# Patient Record
Sex: FEMALE | Race: BLACK OR AFRICAN AMERICAN | NOT HISPANIC OR LATINO | Employment: FULL TIME | URBAN - METROPOLITAN AREA
[De-identification: names, ages, dates, MRNs, and addresses within clinical notes are randomized per-mention and may not be internally consistent; named-entity substitution may affect disease eponyms.]

---

## 2017-01-10 ENCOUNTER — OFFICE VISIT (OUTPATIENT)
Dept: INTERNAL MEDICINE | Facility: CLINIC | Age: 57
End: 2017-01-10
Payer: COMMERCIAL

## 2017-01-10 ENCOUNTER — TELEPHONE (OUTPATIENT)
Dept: INTERNAL MEDICINE | Facility: CLINIC | Age: 57
End: 2017-01-10

## 2017-01-10 VITALS
BODY MASS INDEX: 46.31 KG/M2 | TEMPERATURE: 98 F | HEIGHT: 60 IN | SYSTOLIC BLOOD PRESSURE: 161 MMHG | DIASTOLIC BLOOD PRESSURE: 77 MMHG | RESPIRATION RATE: 16 BRPM | OXYGEN SATURATION: 97 % | HEART RATE: 90 BPM | WEIGHT: 235.88 LBS

## 2017-01-10 DIAGNOSIS — M79.604 LEG PAIN, BILATERAL: Primary | ICD-10-CM

## 2017-01-10 DIAGNOSIS — I15.2 HYPERTENSION ASSOCIATED WITH DIABETES: ICD-10-CM

## 2017-01-10 DIAGNOSIS — E11.59 HYPERTENSION ASSOCIATED WITH DIABETES: ICD-10-CM

## 2017-01-10 DIAGNOSIS — E11.8 TYPE 2 DIABETES MELLITUS WITH COMPLICATION, WITH LONG-TERM CURRENT USE OF INSULIN: ICD-10-CM

## 2017-01-10 DIAGNOSIS — Z79.4 TYPE 2 DIABETES MELLITUS WITH COMPLICATION, WITH LONG-TERM CURRENT USE OF INSULIN: ICD-10-CM

## 2017-01-10 DIAGNOSIS — M79.605 LEG PAIN, BILATERAL: Primary | ICD-10-CM

## 2017-01-10 PROCEDURE — 4010F ACE/ARB THERAPY RXD/TAKEN: CPT | Mod: S$GLB,,, | Performed by: NURSE PRACTITIONER

## 2017-01-10 PROCEDURE — 2022F DILAT RTA XM EVC RTNOPTHY: CPT | Mod: S$GLB,,, | Performed by: NURSE PRACTITIONER

## 2017-01-10 PROCEDURE — 1159F MED LIST DOCD IN RCRD: CPT | Mod: S$GLB,,, | Performed by: NURSE PRACTITIONER

## 2017-01-10 PROCEDURE — 3077F SYST BP >= 140 MM HG: CPT | Mod: S$GLB,,, | Performed by: NURSE PRACTITIONER

## 2017-01-10 PROCEDURE — 3078F DIAST BP <80 MM HG: CPT | Mod: S$GLB,,, | Performed by: NURSE PRACTITIONER

## 2017-01-10 PROCEDURE — 99213 OFFICE O/P EST LOW 20 MIN: CPT | Mod: S$GLB,,, | Performed by: NURSE PRACTITIONER

## 2017-01-10 PROCEDURE — 99999 PR PBB SHADOW E&M-EST. PATIENT-LVL III: CPT | Mod: PBBFAC,,, | Performed by: NURSE PRACTITIONER

## 2017-01-10 PROCEDURE — 3046F HEMOGLOBIN A1C LEVEL >9.0%: CPT | Mod: S$GLB,,, | Performed by: NURSE PRACTITIONER

## 2017-01-10 RX ORDER — DICLOFENAC SODIUM 30 MG/G
GEL TOPICAL
Qty: 100 G | Refills: 2 | Status: SHIPPED | OUTPATIENT
Start: 2017-01-10

## 2017-01-10 NOTE — MR AVS SNAPSHOT
[facility]  [address] [address] [address]  Phone: [phone]                  Treasure Wise   1/10/2017 3:40 PM   Office Visit    Description:  Female : 1960   Provider:  LUCIANO Escobar,FNP-C   Department:  [facility]           Reason for Visit     Leg Pain     Conjunctivitis           Diagnoses this Visit        Comments    Leg pain, bilateral    -  Primary     Hypertension associated with diabetes         Type 2 diabetes mellitus with complication, with long-term current use of insulin                To Do List           Goals (5 Years of Data)     None       These Medications        Disp Refills Start End    diclofenac sodium (SOLARAZE) 3 % gel 100 g 2 1/10/2017     Apply twice daily    Pharmacy: [id number] - [facility] [address]YSON RAMIREZ  [address] [facility] [address] [address]  #: [phone]         [facility]     [facility] On Call Nurse Care Line -  Assistance  Registered nurses in the [facility] On Call Center provide clinical advisement, health education, appointment booking, and other advisory services.  Call for this free service at [phone].             Medications           Message regarding Medications     Verify the changes and/or additions to your medication regime listed below are the same as discussed with your clinician today.  If any of these changes or additions are incorrect, please notify your healthcare provider.        START taking these NEW medications        Refills    diclofenac sodium (SOLARAZE) 3 % gel 2    Sig: Apply twice daily    Class: Normal      STOP taking these medications     naproxen sodium (ANAPROX) 550 MG tablet Take 1 tablet (550 mg total) by mouth every 12 (twelve) hours as needed.           Verify that the below list of medications is an accurate representation of the medications you are currently taking.  If none reported, the list may be blank. If incorrect, please contact your healthcare provider.  Carry this list with you in case of emergency.           Current Medications     aspirin (ECOTRIN) 81 MG EC tablet Take 81 mg by mouth once daily.    benazepril-hydrochlorthiazide (LOTENSIN HCT) 10-12.5 mg Tab Take 1 tablet by mouth once daily.    hydrochlorothiazide (HYDRODIURIL) 25 MG tablet Take 25 mg by mouth once daily.    insulin aspart protamine-insulin aspart (NOVOLOG 70/30) 100 unit/mL (70-30) InPn pen Inject 20 Units into the skin 2 (two) times daily before meals.    metoprolol succinate (TOPROL-XL) 50 MG 24 hr tablet Take 50 mg by mouth once daily.    diclofenac sodium (SOLARAZE) 3 % gel Apply twice daily           Clinical Reference Information           Vital Signs - Last Recorded  Most recent update: 1/10/2017  3:36 PM by Michael Will MA    BP Pulse Temp Resp    (!) 161/77 (BP Location: Left arm, Patient Position: Sitting, BP Method: Automatic) 90 97.9 °F (36.6 °C) (Tympanic) 16    Ht Wt SpO2 BMI    5' (1.524 m) 107 kg (235 lb 14.3 oz) 97% 46.07 kg/m2      Blood Pressure          Most Recent Value    BP  (!)  161/77      Allergies as of 1/10/2017     Latex, Natural Rubber      Immunizations Administered on Date of Encounter - 1/10/2017     None

## 2017-01-10 NOTE — PROGRESS NOTES
Subjective:       Patient ID: Treasure Wise is a 56 y.o. female.    Chief Complaint: Leg Pain (both ) and Conjunctivitis    Leg Pain    The incident occurred more than 1 week ago (11/1/2016). The injury mechanism was a fall. The pain is present in the left leg and right leg (lower). The quality of the pain is described as aching. The pain is moderate. The pain has been fluctuating since onset. Pertinent negatives include no tingling. She reports no foreign bodies present. The symptoms are aggravated by movement (cold weather). She has tried ice, rest and heat (tramadol) for the symptoms. Improvement on treatment: hot bath.     Review of Systems   Constitutional: Positive for fatigue.   Respiratory: Negative for shortness of breath and wheezing.    Cardiovascular: Positive for leg swelling. Negative for chest pain and palpitations.   Musculoskeletal: Positive for arthralgias (both legs), back pain (both legs) and myalgias (both legs).   Neurological: Negative for dizziness, tingling, speech difficulty, weakness, light-headedness and headaches.       Objective:      Physical Exam   Constitutional: She is oriented to person, place, and time. She appears well-developed and well-nourished.   Abdominal: Soft. Bowel sounds are normal. She exhibits no distension. There is no tenderness.   Musculoskeletal:        Left lower leg: She exhibits tenderness, swelling and edema.        Legs:  Neurological: She is alert and oriented to person, place, and time.   Skin: Skin is warm and dry.   Psychiatric: She has a normal mood and affect. Her behavior is normal. Judgment and thought content normal.       Assessment:       1. Leg pain, bilateral    2. Hypertension associated with diabetes    3. Type 2 diabetes mellitus with complication, with long-term current use of insulin        Plan:       *Leg pain, bilateral  -     diclofenac sodium (SOLARAZE) 3 % gel; Apply twice daily  Dispense: 100 g; Refill: 2  - Venous insuf vs neurop vs  ortho issue. Will f/u with Dr Iraheta as scheduled Friday (1/13/17)    Hypertension associated with diabetes   Attributes to stress, follows with Dr Iraheta    Type 2 diabetes mellitus with complication, with long-term current use of insulin    **    Dr Iraheta on Friday for DM and HTN as sched